# Patient Record
Sex: FEMALE | Race: WHITE | NOT HISPANIC OR LATINO | Employment: FULL TIME | URBAN - METROPOLITAN AREA
[De-identification: names, ages, dates, MRNs, and addresses within clinical notes are randomized per-mention and may not be internally consistent; named-entity substitution may affect disease eponyms.]

---

## 2023-01-10 ENCOUNTER — HOSPITAL ENCOUNTER (EMERGENCY)
Facility: HOSPITAL | Age: 38
Discharge: HOME | End: 2023-01-10
Attending: EMERGENCY MEDICINE
Payer: COMMERCIAL

## 2023-01-10 VITALS
WEIGHT: 192.88 LBS | BODY MASS INDEX: 34.18 KG/M2 | OXYGEN SATURATION: 98 % | SYSTOLIC BLOOD PRESSURE: 114 MMHG | TEMPERATURE: 99 F | HEIGHT: 63 IN | RESPIRATION RATE: 17 BRPM | DIASTOLIC BLOOD PRESSURE: 68 MMHG | HEART RATE: 86 BPM

## 2023-01-10 DIAGNOSIS — R10.32 LEFT LOWER QUADRANT PAIN: Primary | ICD-10-CM

## 2023-01-10 DIAGNOSIS — N83.201 BILATERAL OVARIAN CYSTS: ICD-10-CM

## 2023-01-10 DIAGNOSIS — N83.202 BILATERAL OVARIAN CYSTS: ICD-10-CM

## 2023-01-10 LAB
ALBUMIN SERPL BCP-MCNC: 3.9 G/DL (ref 3.5–5.2)
ALP SERPL-CCNC: 64 U/L (ref 55–135)
ALT SERPL W/O P-5'-P-CCNC: 26 U/L (ref 10–44)
ANION GAP SERPL CALC-SCNC: 7 MMOL/L (ref 8–16)
AST SERPL-CCNC: 17 U/L (ref 10–40)
B-HCG UR QL: NEGATIVE
BASOPHILS # BLD AUTO: 0.06 K/UL (ref 0–0.2)
BASOPHILS NFR BLD: 0.5 % (ref 0–1.9)
BILIRUB SERPL-MCNC: 0.3 MG/DL (ref 0.1–1)
BUN SERPL-MCNC: 8 MG/DL (ref 6–20)
CALCIUM SERPL-MCNC: 8.7 MG/DL (ref 8.7–10.5)
CHLORIDE SERPL-SCNC: 107 MMOL/L (ref 95–110)
CO2 SERPL-SCNC: 22 MMOL/L (ref 23–29)
CREAT SERPL-MCNC: 0.7 MG/DL (ref 0.5–1.4)
DIFFERENTIAL METHOD: ABNORMAL
EOSINOPHIL # BLD AUTO: 0.2 K/UL (ref 0–0.5)
EOSINOPHIL NFR BLD: 1.9 % (ref 0–8)
ERYTHROCYTE [DISTWIDTH] IN BLOOD BY AUTOMATED COUNT: 13.5 % (ref 11.5–14.5)
EST. GFR  (NO RACE VARIABLE): >60 ML/MIN/1.73 M^2
GLUCOSE SERPL-MCNC: 120 MG/DL (ref 70–110)
HCT VFR BLD AUTO: 42.9 % (ref 37–48.5)
HGB BLD-MCNC: 14.6 G/DL (ref 12–16)
IMM GRANULOCYTES # BLD AUTO: 0.04 K/UL (ref 0–0.04)
IMM GRANULOCYTES NFR BLD AUTO: 0.4 % (ref 0–0.5)
LYMPHOCYTES # BLD AUTO: 3.1 K/UL (ref 1–4.8)
LYMPHOCYTES NFR BLD: 27.9 % (ref 18–48)
MCH RBC QN AUTO: 31.6 PG (ref 27–31)
MCHC RBC AUTO-ENTMCNC: 34 G/DL (ref 32–36)
MCV RBC AUTO: 93 FL (ref 82–98)
MONOCYTES # BLD AUTO: 0.9 K/UL (ref 0.3–1)
MONOCYTES NFR BLD: 8.3 % (ref 4–15)
NEUTROPHILS # BLD AUTO: 6.9 K/UL (ref 1.8–7.7)
NEUTROPHILS NFR BLD: 61 % (ref 38–73)
NRBC BLD-RTO: 0 /100 WBC
PLATELET # BLD AUTO: 311 K/UL (ref 150–450)
PMV BLD AUTO: 10.1 FL (ref 9.2–12.9)
POTASSIUM SERPL-SCNC: 3.5 MMOL/L (ref 3.5–5.1)
PROT SERPL-MCNC: 7.1 G/DL (ref 6–8.4)
RBC # BLD AUTO: 4.62 M/UL (ref 4–5.4)
SODIUM SERPL-SCNC: 136 MMOL/L (ref 136–145)
WBC # BLD AUTO: 11.26 K/UL (ref 3.9–12.7)

## 2023-01-10 PROCEDURE — 86803 HEPATITIS C AB TEST: CPT | Performed by: EMERGENCY MEDICINE

## 2023-01-10 PROCEDURE — 85025 COMPLETE CBC W/AUTO DIFF WBC: CPT | Performed by: EMERGENCY MEDICINE

## 2023-01-10 PROCEDURE — 63600175 PHARM REV CODE 636 W HCPCS: Performed by: EMERGENCY MEDICINE

## 2023-01-10 PROCEDURE — 25500020 PHARM REV CODE 255

## 2023-01-10 PROCEDURE — 81025 URINE PREGNANCY TEST: CPT | Performed by: EMERGENCY MEDICINE

## 2023-01-10 PROCEDURE — 99285 EMERGENCY DEPT VISIT HI MDM: CPT | Mod: 25

## 2023-01-10 PROCEDURE — 80053 COMPREHEN METABOLIC PANEL: CPT | Performed by: EMERGENCY MEDICINE

## 2023-01-10 PROCEDURE — 96374 THER/PROPH/DIAG INJ IV PUSH: CPT

## 2023-01-10 PROCEDURE — 87389 HIV-1 AG W/HIV-1&-2 AB AG IA: CPT | Performed by: EMERGENCY MEDICINE

## 2023-01-10 PROCEDURE — 36415 COLL VENOUS BLD VENIPUNCTURE: CPT | Performed by: EMERGENCY MEDICINE

## 2023-01-10 RX ORDER — KETOROLAC TROMETHAMINE 30 MG/ML
30 INJECTION, SOLUTION INTRAMUSCULAR; INTRAVENOUS
Status: COMPLETED | OUTPATIENT
Start: 2023-01-10 | End: 2023-01-10

## 2023-01-10 RX ORDER — HYDROCODONE BITARTRATE AND ACETAMINOPHEN 5; 325 MG/1; MG/1
1 TABLET ORAL EVERY 6 HOURS PRN
Qty: 12 TABLET | Refills: 0 | Status: SHIPPED | OUTPATIENT
Start: 2023-01-10 | End: 2023-01-20

## 2023-01-10 RX ADMIN — KETOROLAC TROMETHAMINE 30 MG: 30 INJECTION, SOLUTION INTRAMUSCULAR; INTRAVENOUS at 06:01

## 2023-01-10 RX ADMIN — IOHEXOL 100 ML: 350 INJECTION, SOLUTION INTRAVENOUS at 06:01

## 2023-01-10 NOTE — Clinical Note
"Clemente Dixonie" Jon was seen and treated in our emergency department on 1/10/2023.  She may return to work on 01/12/2023.       If you have any questions or concerns, please don't hesitate to call.      Tracy Beard RN    "

## 2023-01-10 NOTE — ED PROVIDER NOTES
Encounter Date: 1/10/2023    SCRIBE #1 NOTE: I, Aman Rosario, am scribing for, and in the presence of,  Roberto Dubose III, MD.     History     Chief Complaint   Patient presents with    Abdominal Pain     LL abd pain patient reports that she has an ovarian cyst. Gyn appt on Monday     Time seen by provider: 4:59 PM on 01/10/2023    Clemente Webb is a 37 y.o. female who presents to the ED with an onset of acute on chronic left lower abdominal pain that began 3 weeks ago. She states she has had the discomfort in the same area for several years, starting with her diagnosis of an ovarian cyst about 4-5 years ago. She describes the pain as a discomfort for several years, painful for several weeks, and very painful for about a day. She has an appointment to meet with her gynecologist in 5 days. She and her  state that is very unlikely she is pregnant, because they haven't been having sex recently due to her symptoms. She denies being on any birth control. She does not report any allergies to medications. The patient denies vaginal discharge, irregular vaginal bleeding, or any other symptoms at this time. PMHx of ovarian cyst. There is no pertinent PSHx.    The history is provided by the patient and the spouse.   Review of patient's allergies indicates:  No Known Allergies  Past Medical History:   Diagnosis Date    Ovarian cyst      Past Surgical History:   Procedure Laterality Date    MOUTH SURGERY       History reviewed. No pertinent family history.  Social History     Tobacco Use    Smoking status: Every Day     Packs/day: 2.00     Types: Cigarettes    Smokeless tobacco: Never   Substance Use Topics    Alcohol use: Not Currently    Drug use: Never     Review of Systems   Constitutional:  Negative for activity change, appetite change, chills, fatigue and fever.   Eyes:  Negative for visual disturbance.   Respiratory:  Negative for apnea and shortness of breath.    Cardiovascular:  Negative for chest pain and  palpitations.   Gastrointestinal:  Positive for abdominal pain. Negative for abdominal distention.   Genitourinary:  Negative for difficulty urinating, vaginal bleeding and vaginal discharge.   Musculoskeletal:  Negative for neck pain.   Skin:  Negative for pallor and rash.   Neurological:  Negative for headaches.   Hematological:  Does not bruise/bleed easily.   Psychiatric/Behavioral:  Negative for agitation.      Physical Exam     Initial Vitals   BP Pulse Resp Temp SpO2   01/10/23 1646 01/10/23 1646 01/10/23 1646 01/10/23 1651 01/10/23 1646   120/79 94 18 98.3 °F (36.8 °C) 98 %      MAP       --                Physical Exam    Nursing note and vitals reviewed.  Constitutional: She appears well-developed and well-nourished.   HENT:   Head: Normocephalic and atraumatic.   Eyes: Conjunctivae are normal.   Neck: Neck supple.   Normal range of motion.  Cardiovascular:  Normal rate, regular rhythm and normal heart sounds.     Exam reveals no gallop and no friction rub.       No murmur heard.  Pulmonary/Chest: Effort normal and breath sounds normal. No respiratory distress. She has no wheezes. She has no rhonchi. She has no rales.   Abdominal: Abdomen is soft. She exhibits no distension. There is abdominal tenderness in the left lower quadrant. There is no rebound and no guarding.   Musculoskeletal:         General: Normal range of motion.      Cervical back: Normal range of motion and neck supple.     Neurological: She is alert and oriented to person, place, and time.   Skin: Skin is warm and dry. No erythema.   Psychiatric: She has a normal mood and affect.       ED Course   Procedures  Labs Reviewed   CBC W/ AUTO DIFFERENTIAL - Abnormal; Notable for the following components:       Result Value    MCH 31.6 (*)     All other components within normal limits    Narrative:     Release to patient->Immediate   COMPREHENSIVE METABOLIC PANEL - Abnormal; Notable for the following components:    CO2 22 (*)     Glucose 120 (*)      Anion Gap 7 (*)     All other components within normal limits    Narrative:     Release to patient->Immediate   HIV 1 / 2 ANTIBODY    Narrative:     Release to patient->Immediate   HEPATITIS C ANTIBODY    Narrative:     Release to patient->Immediate   PREGNANCY TEST, URINE RAPID    Narrative:     Specimen Source->Urine          Imaging Results              CT Abdomen Pelvis With Contrast (Final result)  Result time 01/10/23 18:44:36      Final result by Alma Nickerson MD (01/10/23 18:44:36)                   Impression:      No acute abnormality within the abdomen or pelvis to explain patient's pain.    Nonobstructing 2 mm right renal stone.    Bilateral ovarian cysts, largest measuring 12 mm.      Electronically signed by: Alma Nickerson  Date:    01/10/2023  Time:    18:44               Narrative:    EXAMINATION:  CT ABDOMEN PELVIS WITH CONTRAST    CLINICAL HISTORY:  LLQ abdominal pain;    TECHNIQUE:  Low dose axial images, sagittal and coronal reformations were obtained from the lung bases to the pubic symphysis following the IV administration of 100 mL of Omnipaque 350 .  Oral contrast was not given.    COMPARISON:  None.    FINDINGS:  Heart size is normal.  No pericardial effusion.  Patchy basilar airspace opacities versus mosaic attenuation.    Normal in morphology and with smooth contour.  No focal hepatic lesion.  Portal, splenic and superior mesenteric veins are patent.    The gallbladder, spleen, pancreas, adrenal glands are normal.  No intra or extrahepatic biliary ductal dilatation.    The kidneys enhance symmetrically without hydronephrosis.  Nonobstructing 2 mm right renal stone.    Abdominal aorta is normal in caliber.  No large retroperitoneal lymph nodes.    No bowel obstruction or inflammatory change.  Normal appendix.  No free fluid free air.    Uterus is unremarkable.  Bilateral ovarian cysts, largest measuring 13 mm.  No enlarged pelvic lymph nodes.  Bladder is smooth walled.    No soft tissues  are unremarkable.  No acute or aggressive osseous abnormality.                                       Medications   ketorolac injection 30 mg (30 mg Intravenous Given 1/10/23 1826)   iohexoL (OMNIPAQUE 350) 350 mg iodine/mL injection (100 mLs Intravenous Given 1/10/23 1822)     Medical Decision Making:   History:   Old Medical Records: I decided to obtain old medical records.  Clinical Tests:   Lab Tests: Ordered and Reviewed  Radiological Study: Ordered and Reviewed  ED Management:  37-year-old female with a history of chronic pelvic pain presents with acute worsening with left lower quadrant pain.  CT of the abdomen demonstrates bilateral ovarian cyst with no evidence of rupture or torsion.  The ongoing pain is of uncertain etiology.  She is referred to gyn for further workup.  There is no evidence of diverticulitis, appendicitis, ureterolithiasis or intra-abdominal infection.     APC / Resident Notes:   I, Dr. Roberto Dubose III, personally performed the services described in this documentation. All medical record entries made by the scribe were at my direction and in my presence.  I have reviewed the chart and agree that the record reflects my personal performance and is accurate and complete   Scribe Attestation:   Scribe #1: I performed the above scribed service and the documentation accurately describes the services I performed. I attest to the accuracy of the note.                   Clinical Impression:   Final diagnoses:  [R10.32] Left lower quadrant pain (Primary)  [N83.201, N83.202] Bilateral ovarian cysts        ED Disposition Condition    Discharge Stable          ED Prescriptions       Medication Sig Dispense Start Date End Date Auth. Provider    HYDROcodone-acetaminophen (NORCO) 5-325 mg per tablet Take 1 tablet by mouth every 6 (six) hours as needed for Pain. 12 tablet 1/10/2023 1/20/2023 Roberto Dubose III, MD          Follow-up Information       Follow up With Specialties Details Why Contact Info     Your OB  In 5 days               Roberto Dubose III, MD  01/12/23 0290

## 2023-01-10 NOTE — ED NOTES
Pt reports having cyst on Left Ovary since 2018, Pt will occasionally have blood tinged discharge. Pt describes LLQ pain as aching and pinching.

## 2023-01-11 LAB
HCV AB SERPL QL IA: NORMAL
HIV 1+2 AB+HIV1 P24 AG SERPL QL IA: NORMAL

## 2023-05-11 ENCOUNTER — OFFICE VISIT (OUTPATIENT)
Dept: URGENT CARE | Facility: CLINIC | Age: 38
End: 2023-05-11
Payer: COMMERCIAL

## 2023-05-11 VITALS
SYSTOLIC BLOOD PRESSURE: 102 MMHG | OXYGEN SATURATION: 97 % | BODY MASS INDEX: 34.55 KG/M2 | RESPIRATION RATE: 18 BRPM | HEART RATE: 81 BPM | DIASTOLIC BLOOD PRESSURE: 82 MMHG | WEIGHT: 195 LBS | HEIGHT: 63 IN | TEMPERATURE: 97 F

## 2023-05-11 DIAGNOSIS — L73.9 FOLLICULITIS: Primary | ICD-10-CM

## 2023-05-11 DIAGNOSIS — K21.9 GASTROESOPHAGEAL REFLUX DISEASE, UNSPECIFIED WHETHER ESOPHAGITIS PRESENT: ICD-10-CM

## 2023-05-11 DIAGNOSIS — L02.436: ICD-10-CM

## 2023-05-11 PROCEDURE — 99204 PR OFFICE/OUTPT VISIT, NEW, LEVL IV, 45-59 MIN: ICD-10-PCS | Mod: S$GLB,,, | Performed by: NURSE PRACTITIONER

## 2023-05-11 PROCEDURE — 99204 OFFICE O/P NEW MOD 45 MIN: CPT | Mod: S$GLB,,, | Performed by: NURSE PRACTITIONER

## 2023-05-11 RX ORDER — CEPHALEXIN 500 MG/1
500 CAPSULE ORAL EVERY 8 HOURS
Qty: 30 CAPSULE | Refills: 0 | Status: SHIPPED | OUTPATIENT
Start: 2023-05-11 | End: 2023-05-21

## 2023-05-11 RX ORDER — MUPIROCIN 20 MG/G
OINTMENT TOPICAL 3 TIMES DAILY
COMMUNITY
Start: 2022-11-15 | End: 2023-05-29 | Stop reason: ALTCHOICE

## 2023-05-11 RX ORDER — ACETAMINOPHEN 500 MG
3 TABLET ORAL NIGHTLY PRN
COMMUNITY

## 2023-05-11 RX ORDER — OMEPRAZOLE 40 MG/1
40 CAPSULE, DELAYED RELEASE ORAL DAILY
Qty: 21 CAPSULE | Refills: 0 | Status: SHIPPED | OUTPATIENT
Start: 2023-05-11 | End: 2023-06-01

## 2023-05-11 NOTE — PROGRESS NOTES
"Subjective:      Patient ID: Clemente Webb is a 37 y.o. female.    Vitals:  height is 5' 3" (1.6 m) and weight is 88.5 kg (195 lb). Her temperature is 97 °F (36.1 °C). Her blood pressure is 102/82 and her pulse is 81. Her respiration is 18 and oxygen saturation is 97%.     Chief Complaint: Cyst    Cyst  This is a recurrent problem. Episode onset: x's 4 days. The problem has been gradually worsening. Associated symptoms include vomiting (woke from sleep . Hx GERD. No meds). Treatments tried: mupiorocon. The treatment provided no relief.     Constitution: Negative.   HENT: Negative.     Neck: neck negative.   Cardiovascular: Negative.    Eyes: Negative.    Respiratory: Negative.     Gastrointestinal:  Positive for vomiting (woke from sleep . Hx GERD. No meds).   Endocrine: negative.   Genitourinary: Negative.    Musculoskeletal: Negative.    Skin: Negative.     Objective:     Physical Exam   Constitutional: She is oriented to person, place, and time. She appears well-developed.   HENT:   Head: Normocephalic and atraumatic.   Ears:   Right Ear: External ear normal.   Left Ear: External ear normal.   Nose: Nose normal.   Mouth/Throat: Mucous membranes are normal.   Eyes: Conjunctivae and lids are normal.   Neck: Trachea normal. Neck supple.   Cardiovascular: Normal rate, regular rhythm and normal heart sounds.   Pulmonary/Chest: Effort normal and breath sounds normal. No respiratory distress.   Abdominal: Normal appearance and bowel sounds are normal. She exhibits no distension and no mass. Soft. There is abdominal tenderness in the epigastric area.   Musculoskeletal: Normal range of motion.         General: Normal range of motion.   Neurological: She is alert and oriented to person, place, and time. She has normal strength.   Skin: Skin is warm, dry, intact, not diaphoretic, not pale, rash, macular and maculopapular.        Psychiatric: Her speech is normal and behavior is normal. Judgment and thought content normal. "   Nursing note and vitals reviewed.    Assessment:     1. Folliculitis    2. Carbuncle of left thigh    3. Gastroesophageal reflux disease, unspecified whether esophagitis present        Plan:       Folliculitis  -     cephALEXin (KEFLEX) 500 MG capsule; Take 1 capsule (500 mg total) by mouth every 8 (eight) hours. for 10 days  Dispense: 30 capsule; Refill: 0    Carbuncle of left thigh  -     cephALEXin (KEFLEX) 500 MG capsule; Take 1 capsule (500 mg total) by mouth every 8 (eight) hours. for 10 days  Dispense: 30 capsule; Refill: 0    Gastroesophageal reflux disease, unspecified whether esophagitis present  -     omeprazole (PRILOSEC) 40 MG capsule; Take 1 capsule (40 mg total) by mouth once daily. for 21 days  Dispense: 21 capsule; Refill: 0

## 2023-05-29 ENCOUNTER — OFFICE VISIT (OUTPATIENT)
Dept: FAMILY MEDICINE | Facility: CLINIC | Age: 38
End: 2023-05-29
Payer: COMMERCIAL

## 2023-05-29 VITALS
SYSTOLIC BLOOD PRESSURE: 102 MMHG | OXYGEN SATURATION: 98 % | HEART RATE: 97 BPM | RESPIRATION RATE: 16 BRPM | WEIGHT: 198 LBS | TEMPERATURE: 98 F | HEIGHT: 63 IN | BODY MASS INDEX: 35.08 KG/M2 | DIASTOLIC BLOOD PRESSURE: 78 MMHG

## 2023-05-29 DIAGNOSIS — Z13.1 ENCOUNTER FOR SCREENING EXAMINATION FOR IMPAIRED GLUCOSE REGULATION AND DIABETES MELLITUS: ICD-10-CM

## 2023-05-29 DIAGNOSIS — Z12.31 ENCOUNTER FOR SCREENING MAMMOGRAM FOR MALIGNANT NEOPLASM OF BREAST: ICD-10-CM

## 2023-05-29 DIAGNOSIS — Z13.6 ENCOUNTER FOR LIPID SCREENING FOR CARDIOVASCULAR DISEASE: ICD-10-CM

## 2023-05-29 DIAGNOSIS — M25.561 ACUTE PAIN OF RIGHT KNEE: ICD-10-CM

## 2023-05-29 DIAGNOSIS — Z76.89 ESTABLISHING CARE WITH NEW DOCTOR, ENCOUNTER FOR: Primary | ICD-10-CM

## 2023-05-29 DIAGNOSIS — Z13.220 ENCOUNTER FOR LIPID SCREENING FOR CARDIOVASCULAR DISEASE: ICD-10-CM

## 2023-05-29 PROBLEM — G89.29 CHRONIC NONINTRACTABLE HEADACHE: Status: ACTIVE | Noted: 2021-08-23

## 2023-05-29 PROBLEM — G47.00 INSOMNIA: Status: ACTIVE | Noted: 2021-08-23

## 2023-05-29 PROBLEM — F90.0 ATTENTION DEFICIT HYPERACTIVITY DISORDER (ADHD), PREDOMINANTLY INATTENTIVE TYPE: Status: ACTIVE | Noted: 2021-08-23

## 2023-05-29 PROBLEM — I95.1 CHRONIC ORTHOSTATIC HYPOTENSION: Status: ACTIVE | Noted: 2021-08-23

## 2023-05-29 PROBLEM — R51.9 CHRONIC NONINTRACTABLE HEADACHE: Status: ACTIVE | Noted: 2021-08-23

## 2023-05-29 PROCEDURE — 99214 PR OFFICE/OUTPT VISIT, EST, LEVL IV, 30-39 MIN: ICD-10-PCS | Mod: ,,, | Performed by: NURSE PRACTITIONER

## 2023-05-29 PROCEDURE — 99214 OFFICE O/P EST MOD 30 MIN: CPT | Mod: ,,, | Performed by: NURSE PRACTITIONER

## 2023-05-29 NOTE — PROGRESS NOTES
Subjective:       Patient ID: Clemente Webb is a 38 y.o. female.    Chief Complaint: Establish Care and Knee Pain    Clemente Webb presents to the clinic today to establish care, but reports new onset of right knee pain following an accident with her 55 pound bulldog on Friday  Reports history of ovarian cysts- under [facility] in [place].   Reports newly rescued Bulldog ran at her on Friday making impact with her right knee knocking her backwards onto the ground. Reports with impact she felt/heard a pop like sensation and began having right knee pain. Since Friday she has been elevating, resting, alternating Tylenol and 800 mg Ibuprofen. Reports she did take a left over Norco 5 mg she had at home on Friday night after the initial accident. She reports since the injury she has been experiencing pain, swelling, decreased ROM. States that some of the swelling has gotten slightly better, but has not resolved.     Review of Systems   All other systems reviewed and are negative.    Patient Active Problem List   Diagnosis    Attention deficit hyperactivity disorder (ADHD), predominantly inattentive type    Chronic nonintractable headache    Chronic orthostatic hypotension    Insomnia    Low back pain       Objective:      Physical Exam  Constitutional:       General: She is not in acute distress.     Appearance: Normal appearance.   Eyes:      Extraocular Movements: Extraocular movements intact.      Conjunctiva/sclera: Conjunctivae normal.   Cardiovascular:      Rate and Rhythm: Normal rate and regular rhythm.      Heart sounds: Normal heart sounds. No murmur heard.  Pulmonary:      Effort: Pulmonary effort is normal. No respiratory distress.      Breath sounds: Normal breath sounds.   Musculoskeletal:      Right knee: Swelling and bony tenderness present. Decreased range of motion. Tenderness present.      Right lower leg: No edema.      Left lower leg: No edema.   Skin:     General: Skin is warm and dry.      Findings: No  "rash.   Neurological:      Mental Status: She is alert and oriented to person, place, and time.   Psychiatric:         Mood and Affect: Mood normal.         Behavior: Behavior normal.       Lab Results   Component Value Date    WBC 11.26 01/10/2023    HGB 14.6 01/10/2023    HCT 42.9 01/10/2023     01/10/2023    ALT 26 01/10/2023    AST 17 01/10/2023     01/10/2023    K 3.5 01/10/2023     01/10/2023    CREATININE 0.7 01/10/2023    BUN 8 01/10/2023    CO2 22 (L) 01/10/2023     The ASCVD Risk score (Arabella WELCH, et al., 2019) failed to calculate for the following reasons:    Cannot find a previous HDL lab    Cannot find a previous total cholesterol lab  Visit Vitals  /78 (BP Location: Left arm, Patient Position: Sitting, BP Method: Medium (Manual))   Pulse 97   Temp 98.1 °F (36.7 °C) (Oral)   Resp 16   Ht 5' 3" (1.6 m)   Wt 89.8 kg (197 lb 15.6 oz)   LMP 05/11/2023   SpO2 98%   BMI 35.07 kg/m²      Assessment:       1. Establishing care with new doctor, encounter for    2. Acute pain of right knee    3. Encounter for lipid screening for cardiovascular disease    4. Encounter for screening examination for impaired glucose regulation and diabetes mellitus    5. Encounter for screening mammogram for malignant neoplasm of breast        Plan:       1. Establishing care with new doctor, encounter for    2. Acute pain of right knee  -     X-Ray Knee 3 View Right; Future; Expected date: 05/29/2023  Continue RICE   Continue alternating Tylenol/Ibuprofen for discomfort   Obtain image for review.   3. Encounter for lipid screening for cardiovascular disease  -     Lipid Panel; Future; Expected date: 05/29/2023    4. Encounter for screening examination for impaired glucose regulation and diabetes mellitus  -     Hemoglobin A1C; Standing    5. Encounter for screening mammogram for malignant neoplasm of breast  -     Mammo Digital Screening Bilat w/ Tashi; Future; Expected date: 05/29/2023  Monthly self breast " exams   Obtain screening mammogram      No follow-ups on file.      Future Appointments       Date Provider Specialty Appt Notes    6/1/2023  Radiology Acute pain of right knee [M25.561]    6/1/2023  Lab     6/12/2023 Michelle Klein NP Family Medicine 2 week f/u

## 2023-06-01 ENCOUNTER — TELEPHONE (OUTPATIENT)
Dept: ORTHOPEDICS | Facility: CLINIC | Age: 38
End: 2023-06-01
Payer: COMMERCIAL

## 2023-06-01 ENCOUNTER — HOSPITAL ENCOUNTER (OUTPATIENT)
Dept: RADIOLOGY | Facility: CLINIC | Age: 38
Discharge: HOME | End: 2023-06-01
Attending: NURSE PRACTITIONER
Payer: COMMERCIAL

## 2023-06-01 ENCOUNTER — LAB VISIT (OUTPATIENT)
Dept: LAB | Facility: CLINIC | Age: 38
End: 2023-06-01
Payer: COMMERCIAL

## 2023-06-01 DIAGNOSIS — Z13.220 ENCOUNTER FOR LIPID SCREENING FOR CARDIOVASCULAR DISEASE: ICD-10-CM

## 2023-06-01 DIAGNOSIS — Z13.1 ENCOUNTER FOR SCREENING EXAMINATION FOR IMPAIRED GLUCOSE REGULATION AND DIABETES MELLITUS: ICD-10-CM

## 2023-06-01 DIAGNOSIS — S82.141A CLOSED FRACTURE OF RIGHT TIBIAL PLATEAU, INITIAL ENCOUNTER: Primary | ICD-10-CM

## 2023-06-01 DIAGNOSIS — Z13.6 ENCOUNTER FOR LIPID SCREENING FOR CARDIOVASCULAR DISEASE: ICD-10-CM

## 2023-06-01 DIAGNOSIS — M25.561 ACUTE PAIN OF RIGHT KNEE: ICD-10-CM

## 2023-06-01 LAB
CHOLEST SERPL-MCNC: 183 MG/DL (ref 120–199)
CHOLEST/HDLC SERPL: 5.1 {RATIO} (ref 2–5)
ESTIMATED AVG GLUCOSE: 97 MG/DL (ref 68–131)
HBA1C MFR BLD: 5 % (ref 4–5.6)
HDLC SERPL-MCNC: 36 MG/DL (ref 40–75)
HDLC SERPL: 19.7 % (ref 20–50)
LDLC SERPL CALC-MCNC: 125.6 MG/DL (ref 63–159)
NONHDLC SERPL-MCNC: 147 MG/DL
TRIGL SERPL-MCNC: 107 MG/DL (ref 30–150)

## 2023-06-01 PROCEDURE — 73562 X-RAY EXAM OF KNEE 3: CPT | Mod: 26,RT,, | Performed by: RADIOLOGY

## 2023-06-01 PROCEDURE — 73562 X-RAY EXAM OF KNEE 3: CPT | Mod: TC,RT,, | Performed by: FAMILY MEDICINE

## 2023-06-01 PROCEDURE — 73562 XR KNEE 3 VIEW RIGHT: ICD-10-PCS | Mod: 26,RT,, | Performed by: RADIOLOGY

## 2023-06-01 PROCEDURE — 73562 XR KNEE 3 VIEW RIGHT: ICD-10-PCS | Mod: TC,RT,, | Performed by: FAMILY MEDICINE

## 2023-06-01 PROCEDURE — 80061 LIPID PANEL: CPT | Performed by: NURSE PRACTITIONER

## 2023-06-01 PROCEDURE — 83036 HEMOGLOBIN GLYCOSYLATED A1C: CPT | Performed by: NURSE PRACTITIONER

## 2023-06-01 NOTE — TELEPHONE ENCOUNTER
----- Message from Yg Medellin MD sent at 6/1/2023 10:41 AM CDT -----  Contact: patient  I am okay with seeing her but we are out on Monday so unless she wants to come to [place] on Wednesday, June 8th this our next available.  They could certainly see Kirill or haylee or Luke if they have sooner appts  ----- Message -----  From: Madhuri Pineda MA  Sent: 6/1/2023  10:27 AM CDT  To: Yg Medellin MD    Saw family [facility] today   Ok with seeing?   ----- Message -----  From: Milton Redd MA  Sent: 6/1/2023  10:03 AM CDT  To: Vignesh Barreto Staff    Patient has referral in system from her PCP for S82.141A (ICD-10-CM) - Closed fracture of right tibial plateau, initial encounter but stated she would like to be seen prior to next available of 6/8/23.  Referral is for Dr. Medellin.    Call back number is [phone]

## 2023-06-05 ENCOUNTER — PATIENT MESSAGE (OUTPATIENT)
Dept: FAMILY MEDICINE | Facility: CLINIC | Age: 38
End: 2023-06-05
Payer: COMMERCIAL

## 2023-06-08 ENCOUNTER — OFFICE VISIT (OUTPATIENT)
Dept: ORTHOPEDICS | Facility: CLINIC | Age: 38
End: 2023-06-08
Payer: COMMERCIAL

## 2023-06-08 ENCOUNTER — HOSPITAL ENCOUNTER (OUTPATIENT)
Dept: RADIOLOGY | Facility: HOSPITAL | Age: 38
Discharge: HOME | End: 2023-06-08
Attending: ORTHOPAEDIC SURGERY
Payer: COMMERCIAL

## 2023-06-08 VITALS — BODY MASS INDEX: 34.91 KG/M2 | WEIGHT: 197 LBS | HEIGHT: 63 IN | RESPIRATION RATE: 18 BRPM

## 2023-06-08 DIAGNOSIS — M25.561 RIGHT KNEE PAIN, UNSPECIFIED CHRONICITY: ICD-10-CM

## 2023-06-08 DIAGNOSIS — M25.561 RIGHT KNEE PAIN, UNSPECIFIED CHRONICITY: Primary | ICD-10-CM

## 2023-06-08 DIAGNOSIS — S82.141A CLOSED FRACTURE OF RIGHT TIBIAL PLATEAU, INITIAL ENCOUNTER: ICD-10-CM

## 2023-06-08 PROCEDURE — 73564 X-RAY EXAM KNEE 4 OR MORE: CPT | Mod: 26,RT,, | Performed by: RADIOLOGY

## 2023-06-08 PROCEDURE — 99999 PR PBB SHADOW E&M-EST. PATIENT-LVL III: CPT | Mod: PBBFAC,,, | Performed by: ORTHOPAEDIC SURGERY

## 2023-06-08 PROCEDURE — 73562 XR KNEE ORTHO RIGHT WITH FLEXION: ICD-10-PCS | Mod: 26,LT,, | Performed by: RADIOLOGY

## 2023-06-08 PROCEDURE — 99204 OFFICE O/P NEW MOD 45 MIN: CPT | Mod: S$GLB,,, | Performed by: ORTHOPAEDIC SURGERY

## 2023-06-08 PROCEDURE — 73562 X-RAY EXAM OF KNEE 3: CPT | Mod: 26,LT,, | Performed by: RADIOLOGY

## 2023-06-08 PROCEDURE — 73564 XR KNEE ORTHO RIGHT WITH FLEXION: ICD-10-PCS | Mod: 26,RT,, | Performed by: RADIOLOGY

## 2023-06-08 PROCEDURE — 99204 PR OFFICE/OUTPT VISIT, NEW, LEVL IV, 45-59 MIN: ICD-10-PCS | Mod: S$GLB,,, | Performed by: ORTHOPAEDIC SURGERY

## 2023-06-08 PROCEDURE — 73564 X-RAY EXAM KNEE 4 OR MORE: CPT | Mod: TC,PO,RT

## 2023-06-08 PROCEDURE — 99999 PR PBB SHADOW E&M-EST. PATIENT-LVL III: ICD-10-PCS | Mod: PBBFAC,,, | Performed by: ORTHOPAEDIC SURGERY

## 2023-06-08 NOTE — PROGRESS NOTES
Past Medical History:   Diagnosis Date    Ovarian cyst        Past Surgical History:   Procedure Laterality Date    CERVICAL BIOPSY  W/ LOOP ELECTRODE EXCISION      MOUTH SURGERY         Current Outpatient Medications   Medication Sig    melatonin (MELATIN) 5 mg Take 3 mg by mouth nightly as needed.    omeprazole (PRILOSEC) 40 MG capsule Take 1 capsule (40 mg total) by mouth once daily. for 21 days     No current facility-administered medications for this visit.       Review of patient's allergies indicates:   Allergen Reactions    Ketorolac tromethamine Rash       History reviewed. No pertinent family history.    Social History     Socioeconomic History    Marital status:    Tobacco Use    Smoking status: Every Day     Packs/day: 2.00     Types: Cigarettes    Smokeless tobacco: Never   Substance and Sexual Activity    Alcohol use: Not Currently    Drug use: Never    Sexual activity: Yes     Partners: Male       Chief Complaint: No chief complaint on file.      History of present illness:  38-year-old female seen for right knee pain.  Patient was injured on May 25th when her dog ran into her leg.  She felt a pop.  X-rays taken at a urgent care showed a tibial plateau fracture.  Patient is in a brace and using crutches.  Pain is getting better.  Pain is a 2/10.    Answers submitted by the patient for this visit:  Orthopedics Questionnaire (Submitted on 6/8/2023)  unexpected weight change: No  appetite change : No  sleep disturbance: No  IMMUNOCOMPROMISED: No  nervous/ anxious: No  dysphoric mood: No  rash: No  visual disturbance: No  eye redness: No  eye pain: No  ear pain: No  tinnitus: No  hearing loss: No  sinus pressure : No  nosebleeds: No  enviro allergies: No  food allergies: No  cough: No  shortness of breath: No  sweating: No  dysuria: No  frequency: No  difficulty urinating: No  hematuria: No  painful intercourse: No  chest pain: No  palpitations: No  nausea: No  vomiting: No  diarrhea: No  blood in  stool: No  constipation: No  headaches: No  dizziness: No  numbness: No  seizures: No  joint swelling: Yes  myalgia: Yes  weakness: Yes  back pain: No   (Submitted on 6/8/2023)  Chief Complaint: Leg pain  Pain Chronicity: new  History of trauma: Yes  Onset: 1 to 4 weeks ago  Frequency: daily  Progression since onset: waxing and waning  Injury mechanism: collision/contact  injury location: on the field  pain- numeric: 2/10  pain location: right knee  pain quality: dull, hot, tightness, burning, numbing, throbbing, tingling, numb  Radiating Pain: No  Aggravating factors: activity, bending, extension, rotation  fever: No  inability to bear weight: Yes  itching: No  joint locking: Yes  limited range of motion: Yes  stiffness: Yes  tingling: Yes  Treatments tried: rest  physical therapy: not tried  Improvement on treatment: no relief      Physical Examination:    Vital Signs:  There were no vitals filed for this visit.    There is no height or weight on file to calculate BMI.    This a well-developed, well nourished patient in no acute distress.  They are alert and oriented and cooperative to examination.  Pt. walks without an antalgic gait.      Examination of the right knee shows no rashes or erythema. There are no masses ecchymosis or effusion. Patient has full range of motion from 0-130°. Patient is mildly tender to palpation over lateral joint line and nontender to palpation over the medial joint line. Patient has a - Lachman exam, - anterior drawer exam, and - posterior drawer exam. - Apley exam. Knee is stable to varus and valgus stress. 5 out of 5 motor strength. Palpable distal pulses. Intact light touch sensation. Negative Patellofemoral crepitus      X-rays:  X-rays of the right knee are ordered and reviewed which show a mild depression on the lateral plateau.     Assessment::  Right tibial plateau fracture    Plan:  I reviewed the findings with her today.  Patient has a very minimally displaced fracture of  the lateral tibial plateau.  Recommended non operative treatment with a protective brace.  Recommend protected weight-bearing with nonweightbearing or toe-touch weight-bearing.  Okay for normal range of motion.  Follow-up in 4 weeks with repeat x-ray of the right knee.  If signs of healing can progress weight-bearing at that time    All previous pertinent notes including ER visits, physical therapy visits, other orthopedic visits as well as other care for the same musculoskeletal problem were reviewed.  All pertinent lab values and previous imaging was reviewed pertinent to the current visit.    This note was created using [employer] voice recognition software that occasionally misinterpreted phrases or words.    Consult note is delivered via Epic messaging service.

## 2023-06-12 ENCOUNTER — OFFICE VISIT (OUTPATIENT)
Dept: FAMILY MEDICINE | Facility: CLINIC | Age: 38
End: 2023-06-12
Payer: COMMERCIAL

## 2023-06-12 VITALS
SYSTOLIC BLOOD PRESSURE: 106 MMHG | HEART RATE: 90 BPM | DIASTOLIC BLOOD PRESSURE: 72 MMHG | BODY MASS INDEX: 35.44 KG/M2 | RESPIRATION RATE: 18 BRPM | HEIGHT: 63 IN | WEIGHT: 200 LBS | OXYGEN SATURATION: 97 %

## 2023-06-12 DIAGNOSIS — R51.9 CHRONIC NONINTRACTABLE HEADACHE, UNSPECIFIED HEADACHE TYPE: ICD-10-CM

## 2023-06-12 DIAGNOSIS — F17.200 NICOTINE DEPENDENCE WITH CURRENT USE: ICD-10-CM

## 2023-06-12 DIAGNOSIS — G89.29 CHRONIC NONINTRACTABLE HEADACHE, UNSPECIFIED HEADACHE TYPE: ICD-10-CM

## 2023-06-12 DIAGNOSIS — S82.141A CLOSED FRACTURE OF RIGHT TIBIAL PLATEAU, INITIAL ENCOUNTER: Primary | ICD-10-CM

## 2023-06-12 PROCEDURE — 99214 PR OFFICE/OUTPT VISIT, EST, LEVL IV, 30-39 MIN: ICD-10-PCS | Mod: ,,, | Performed by: NURSE PRACTITIONER

## 2023-06-12 PROCEDURE — 99214 OFFICE O/P EST MOD 30 MIN: CPT | Mod: ,,, | Performed by: NURSE PRACTITIONER

## 2023-06-12 RX ORDER — TOPIRAMATE 25 MG/1
TABLET ORAL
Qty: 60 TABLET | Refills: 3 | Status: SHIPPED | OUTPATIENT
Start: 2023-06-12

## 2023-06-12 RX ORDER — SUMATRIPTAN 50 MG/1
TABLET, FILM COATED ORAL
Qty: 9 TABLET | Refills: 5 | Status: SHIPPED | OUTPATIENT
Start: 2023-06-12

## 2023-06-12 NOTE — PROGRESS NOTES
Subjective:       Patient ID: Clemente Webb is a 38 y.o. female.    Chief Complaint: Follow-up (Pt presents to the clinic for a 2 week f/u)    Clemente Webb presents to the clinic today for 2 week follow up after recently establishing primary care     She was referred and seen by Orthopedics Dr. Medellin 6/8/2023    X-rays:  X-rays of the right knee are ordered and reviewed which show a mild depression on the lateral plateau.     Assessment::  Right tibial plateau fracture     Plan:  I reviewed the findings with her today.  Patient has a very minimally displaced fracture of the lateral tibial plateau.  Recommended non operative treatment with a protective brace.  Recommend protected weight-bearing with nonweightbearing or toe-touch weight-bearing.  Okay for normal range of motion.  Follow-up in 4 weeks with repeat x-ray of the right knee.  If signs of healing can progress weight-bearing at that time    Migraines:  HX of chronic intermittent headaches- described as different types.   Has never seen neurology. Denies any imaging historically. Was prescribed preventatives in the past, but was unable to afford them.   1st- most common, most bothersome. It usually starts in the posterior aspect of her head and it is dull and achy and it is often associated with photosensitivity and nausea. Worsens with coughing, sneezing. Describes as a pressure. Can lingers for hours.Sometimes they respond partially to over-the-counter products like Tylenol, ibuprofen and BC powders.     2nd Type- Occurs behind the eyes. Intense pressure. Induces nausea and light sensitivity. At times can have quick floaters that resolve. On occasion the pressure will go to the temporal region as sharp/fast stabbing/shooting sensations that quickly resolve.    Wears corrective lenses- is UTD on vision screen     Review of Systems   Constitutional: Negative.    HENT: Negative.     Eyes: Negative.    Respiratory:  Negative for chest tightness, shortness of  breath and wheezing.    Cardiovascular:  Negative for chest pain and palpitations.   Gastrointestinal:  Negative for abdominal pain, constipation and diarrhea.   Endocrine: Negative.    Genitourinary: Negative.    Musculoskeletal:  Positive for arthralgias and gait problem.   Allergic/Immunologic: Negative.    Neurological:  Positive for headaches. Negative for dizziness and weakness.   Hematological: Negative.    Psychiatric/Behavioral: Negative.       Patient Active Problem List   Diagnosis    Attention deficit hyperactivity disorder (ADHD), predominantly inattentive type    Chronic nonintractable headache    Chronic orthostatic hypotension    Insomnia    Low back pain       Objective:      Physical Exam  Constitutional:       General: She is not in acute distress.     Appearance: Normal appearance.      Comments: Ambulating with crutches    Eyes:      Extraocular Movements: Extraocular movements intact.      Conjunctiva/sclera: Conjunctivae normal.   Cardiovascular:      Rate and Rhythm: Normal rate and regular rhythm.      Heart sounds: Normal heart sounds. No murmur heard.  Pulmonary:      Effort: Pulmonary effort is normal. No respiratory distress.      Breath sounds: Normal breath sounds.   Musculoskeletal:      Right knee: Bony tenderness present. Decreased range of motion. Tenderness present.      Right lower leg: No edema.      Left lower leg: No edema.      Comments: Right knee brace intact    Skin:     General: Skin is warm and dry.      Findings: No rash.   Neurological:      Mental Status: She is alert and oriented to person, place, and time.   Psychiatric:         Mood and Affect: Mood normal.         Behavior: Behavior normal.       Lab Results   Component Value Date    WBC 11.26 01/10/2023    HGB 14.6 01/10/2023    HCT 42.9 01/10/2023     01/10/2023    CHOL 183 06/01/2023    TRIG 107 06/01/2023    HDL 36 (L) 06/01/2023    ALT 26 01/10/2023    AST 17 01/10/2023     01/10/2023    K 3.5  "01/10/2023     01/10/2023    CREATININE 0.7 01/10/2023    BUN 8 01/10/2023    CO2 22 (L) 01/10/2023    HGBA1C 5.0 06/01/2023     The 10-year ASCVD risk score (Arabella WELCH, et al., 2019) is: 3.2%    Values used to calculate the score:      Age: 38 years      Sex: Female      Is Non- : No      Diabetic: No      Tobacco smoker: Yes      Systolic Blood Pressure: 106 mmHg      Is BP treated: No      HDL Cholesterol: 36 mg/dL      Total Cholesterol: 183 mg/dL  Visit Vitals  /72 (BP Location: Left arm, Patient Position: Sitting, BP Method: Large (Manual))   Pulse 90   Resp 18   Ht 5' 3" (1.6 m)   Wt 90.7 kg (200 lb)   SpO2 97%   BMI 35.43 kg/m²      Assessment:       1. Closed fracture of right tibial plateau, initial encounter    2. Nicotine dependence with current use    3. Chronic nonintractable headache, unspecified headache type        Plan:       1. Closed fracture of right tibial plateau, initial encounter  Followed by orthopedics- keep all scheduled follow up appointments/ follow all activity restrictions   2. Nicotine dependence with current use  -     Ambulatory referral/consult to Smoking Cessation Program; Future; Expected date: 06/19/2023  Interested in smoking cessation- referral to program for further assistance.   3. Chronic nonintractable headache, unspecified headache type  -     sumatriptan (IMITREX) 50 MG tablet; Take 1 tablet at start of migraine, may repeat dose in 2 hours- max dose of 200 mg/day  Dispense: 9 tablet; Refill: 5  -     topiramate (TOPAMAX) 25 MG tablet; Take 1 tablet by mouth at bedtime x1 week. Then increase to 1 tablet by mouth twice daily  Dispense: 60 tablet; Refill: 3  Trial of topiramate- 25 mg nightly x7-10 days then increase to BID  IMITREX after use of Excedrin migraine  Avoid excessive intake of caffeine, nicotine   Keep migraine/headache journal   + Riboflavin 400 mg daily and Magnesium 400 mg daily   No significant improvement at follow up- " Neurology referral/recommend imaging   Follow up in about 6 weeks (around 7/24/2023).      Future Appointments       Date Provider Specialty Appt Notes    6/20/2023  Radiology     7/6/2023 Yg Medellin MD Orthopedics 4 week fu xray today right knee     7/25/2023 Michelle Klein NP Family Medicine 6 week f/u

## 2023-06-15 NOTE — PATIENT INSTRUCTIONS
Acute abortive treatment:     When migraine symptoms first develop, the patient should rest or sleep in a dark, quiet room with a cool cloth applied to forehead if possible. Early use of medication during the migraine attack, when the headache is still mild, is important      Step 1: For mild headaches or as first step in treatment, Take Excedrin Migraine as directed              -Limit to 14 days per month maximum to avoid medication overuse headache          Step 2: If step 1 medication does not get rid of headache, or if headache is severe from the start, also take sumatriptan (Imitrex) 50 mg               -This dose may be repeated a second time if headache still remains after 2 hours, with maximum of 2 doses per 24 hours               -Limit use to 9 days per month to avoid medication overuse headache               -You may combine this medication with ibuprofen for better effect if it is only somewhat effective               - Side effects may include chest pain/pressure/tightness, hot/cold flashes, sore throat, fatigue, feeling of heaviness, tingling, jaw pain/pressure, neck pain                 Daily preventive treatment     Given that this patient has frequent or long-lasting migraines, migraines that cause significant disability, will initiate prevention at this time with:     Riboflavin 400 mg daily- an cause yellow urine   Magnesium oxide (or elemental magnesium) 400 mg daily      They have previously tried 0 other preventive medications which were stopped for either side effects or lack of efficacy               -Should be continued for at least 6-8 weeks before determining effectiveness

## 2023-07-07 DIAGNOSIS — M25.561 RIGHT KNEE PAIN, UNSPECIFIED CHRONICITY: Primary | ICD-10-CM

## 2023-07-13 ENCOUNTER — OFFICE VISIT (OUTPATIENT)
Dept: ORTHOPEDICS | Facility: CLINIC | Age: 38
End: 2023-07-13
Payer: COMMERCIAL

## 2023-07-13 ENCOUNTER — HOSPITAL ENCOUNTER (OUTPATIENT)
Dept: RADIOLOGY | Facility: HOSPITAL | Age: 38
Discharge: HOME | End: 2023-07-13
Attending: ORTHOPAEDIC SURGERY
Payer: COMMERCIAL

## 2023-07-13 VITALS — WEIGHT: 200 LBS | RESPIRATION RATE: 18 BRPM | HEIGHT: 63 IN | BODY MASS INDEX: 35.44 KG/M2

## 2023-07-13 DIAGNOSIS — S82.141A CLOSED FRACTURE OF RIGHT TIBIAL PLATEAU, INITIAL ENCOUNTER: Primary | ICD-10-CM

## 2023-07-13 DIAGNOSIS — M25.561 RIGHT KNEE PAIN, UNSPECIFIED CHRONICITY: ICD-10-CM

## 2023-07-13 PROCEDURE — 99999 PR PBB SHADOW E&M-EST. PATIENT-LVL III: CPT | Mod: PBBFAC,,, | Performed by: ORTHOPAEDIC SURGERY

## 2023-07-13 PROCEDURE — 73564 XR KNEE ORTHO RIGHT WITH FLEXION: ICD-10-PCS | Mod: 26,RT,, | Performed by: RADIOLOGY

## 2023-07-13 PROCEDURE — 73564 X-RAY EXAM KNEE 4 OR MORE: CPT | Mod: 26,RT,, | Performed by: RADIOLOGY

## 2023-07-13 PROCEDURE — 99999 PR PBB SHADOW E&M-EST. PATIENT-LVL III: ICD-10-PCS | Mod: PBBFAC,,, | Performed by: ORTHOPAEDIC SURGERY

## 2023-07-13 PROCEDURE — 73564 X-RAY EXAM KNEE 4 OR MORE: CPT | Mod: TC,PO,RT

## 2023-07-13 PROCEDURE — 73562 XR KNEE ORTHO RIGHT WITH FLEXION: ICD-10-PCS | Mod: 26,LT,, | Performed by: RADIOLOGY

## 2023-07-13 PROCEDURE — 99214 PR OFFICE/OUTPT VISIT, EST, LEVL IV, 30-39 MIN: ICD-10-PCS | Mod: S$GLB,,, | Performed by: ORTHOPAEDIC SURGERY

## 2023-07-13 PROCEDURE — 73562 X-RAY EXAM OF KNEE 3: CPT | Mod: 26,LT,, | Performed by: RADIOLOGY

## 2023-07-13 PROCEDURE — 99214 OFFICE O/P EST MOD 30 MIN: CPT | Mod: S$GLB,,, | Performed by: ORTHOPAEDIC SURGERY

## 2023-07-13 NOTE — PROGRESS NOTES
Past Medical History:   Diagnosis Date    Ovarian cyst        Past Surgical History:   Procedure Laterality Date    CERVICAL BIOPSY  W/ LOOP ELECTRODE EXCISION      MOUTH SURGERY         Current Outpatient Medications   Medication Sig    melatonin (MELATIN) 5 mg Take 3 mg by mouth nightly as needed.    omeprazole (PRILOSEC) 40 MG capsule Take 1 capsule (40 mg total) by mouth once daily. for 21 days    sumatriptan (IMITREX) 50 MG tablet Take 1 tablet at start of migraine, may repeat dose in 2 hours- max dose of 200 mg/day    topiramate (TOPAMAX) 25 MG tablet Take 1 tablet by mouth at bedtime x1 week. Then increase to 1 tablet by mouth twice daily     No current facility-administered medications for this visit.       Review of patient's allergies indicates:   Allergen Reactions    Ketorolac tromethamine Rash       History reviewed. No pertinent family history.    Social History     Socioeconomic History    Marital status:    Tobacco Use    Smoking status: Every Day     Packs/day: 2.00     Types: Cigarettes    Smokeless tobacco: Never   Substance and Sexual Activity    Alcohol use: Not Currently    Drug use: Never    Sexual activity: Yes     Partners: Male       Chief Complaint: No chief complaint on file.      History of present illness:  38-year-old female seen for right knee pain.  Patient was injured on May 25th when her dog ran into her leg.  She felt a pop.  X-rays taken at a urgent care showed a tibial plateau fracture.  Patient stop using the crutches in the brace a few weeks ago.  Very minimal pain.    Answers submitted by the patient for this visit:  Orthopedics Questionnaire (Submitted on 6/8/2023)  unexpected weight change: No  appetite change : No  sleep disturbance: No  IMMUNOCOMPROMISED: No  nervous/ anxious: No  dysphoric mood: No  rash: No  visual disturbance: No  eye redness: No  eye pain: No  ear pain: No  tinnitus: No  hearing loss: No  sinus pressure : No  nosebleeds: No  enviro allergies:  No  food allergies: No  cough: No  shortness of breath: No  sweating: No  dysuria: No  frequency: No  difficulty urinating: No  hematuria: No  painful intercourse: No  chest pain: No  palpitations: No  nausea: No  vomiting: No  diarrhea: No  blood in stool: No  constipation: No  headaches: No  dizziness: No  numbness: No  seizures: No  joint swelling: Yes  myalgia: Yes  weakness: Yes  back pain: No   (Submitted on 6/8/2023)  Chief Complaint: Leg pain  Pain Chronicity: new  History of trauma: Yes  Onset: 1 to 4 weeks ago  Frequency: daily  Progression since onset: waxing and waning  Injury mechanism: collision/contact  injury location: on the field  pain- numeric: 2/10  pain location: right knee  pain quality: dull, hot, tightness, burning, numbing, throbbing, tingling, numb  Radiating Pain: No  Aggravating factors: activity, bending, extension, rotation  fever: No  inability to bear weight: Yes  itching: No  joint locking: Yes  limited range of motion: Yes  stiffness: Yes  tingling: Yes  Treatments tried: rest  physical therapy: not tried  Improvement on treatment: no relief      Physical Examination:    Vital Signs:  There were no vitals filed for this visit.    There is no height or weight on file to calculate BMI.    This a well-developed, well nourished patient in no acute distress.  They are alert and oriented and cooperative to examination.  Pt. walks without an antalgic gait.      Examination of the right knee shows no rashes or erythema. There are no masses ecchymosis or effusion. Patient has full range of motion from 0-130°. Patient is mildly tender to palpation over lateral joint line and nontender to palpation over the medial joint line.  Knee is stable to varus and valgus stress. 5 out of 5 motor strength. Palpable distal pulses. Intact light touch sensation. Negative Patellofemoral crepitus      X-rays:  X-rays of the right knee are ordered and reviewed which show no obvious Fracture at this time.        Assessment::  Right tibial plateau fracture    Plan:  I reviewed the findings with her today.  Patient has a very minimally displaced fracture of the lateral tibial plateau.   Okay for normal range of motion.  We will let her progress to weight-bearing as tolerated.  We can start some physical therapy at this time as well.  Follow up in 6 weeks with 1 last x-ray of the right knee.    All previous pertinent notes including ER visits, physical therapy visits, other orthopedic visits as well as other care for the same musculoskeletal problem were reviewed.  All pertinent lab values and previous imaging was reviewed pertinent to the current visit.    This note was created using [email or web address] voice recognition software that occasionally misinterpreted phrases or words.    Consult note is delivered via Epic messaging service.

## 2023-07-21 ENCOUNTER — HOSPITAL ENCOUNTER (OUTPATIENT)
Dept: RADIOLOGY | Facility: HOSPITAL | Age: 38
Discharge: HOME | End: 2023-07-21
Attending: NURSE PRACTITIONER
Payer: COMMERCIAL

## 2023-07-21 DIAGNOSIS — Z12.31 ENCOUNTER FOR SCREENING MAMMOGRAM FOR MALIGNANT NEOPLASM OF BREAST: ICD-10-CM

## 2023-07-21 PROCEDURE — 77067 MAMMO DIGITAL SCREENING BILAT WITH TOMO: ICD-10-PCS | Mod: 26,,, | Performed by: RADIOLOGY

## 2023-07-21 PROCEDURE — 77067 SCR MAMMO BI INCL CAD: CPT | Mod: TC

## 2023-07-21 PROCEDURE — 77067 SCR MAMMO BI INCL CAD: CPT | Mod: 26,,, | Performed by: RADIOLOGY

## 2023-07-21 PROCEDURE — 77063 MAMMO DIGITAL SCREENING BILAT WITH TOMO: ICD-10-PCS | Mod: 26,,, | Performed by: RADIOLOGY

## 2023-07-21 PROCEDURE — 77063 BREAST TOMOSYNTHESIS BI: CPT | Mod: 26,,, | Performed by: RADIOLOGY

## 2024-03-19 ENCOUNTER — PATIENT MESSAGE (OUTPATIENT)
Dept: ADMINISTRATIVE | Facility: HOSPITAL | Age: 39
End: 2024-03-19
Payer: COMMERCIAL

## 2024-07-09 ENCOUNTER — PATIENT MESSAGE (OUTPATIENT)
Dept: ADMINISTRATIVE | Facility: HOSPITAL | Age: 39
End: 2024-07-09
Payer: COMMERCIAL

## 2024-07-18 ENCOUNTER — PATIENT MESSAGE (OUTPATIENT)
Dept: FAMILY MEDICINE | Facility: CLINIC | Age: 39
End: 2024-07-18
Payer: COMMERCIAL

## 2024-07-29 ENCOUNTER — TELEPHONE (OUTPATIENT)
Dept: FAMILY MEDICINE | Facility: CLINIC | Age: 39
End: 2024-07-29
Payer: COMMERCIAL

## 2024-10-11 ENCOUNTER — HOSPITAL ENCOUNTER (OUTPATIENT)
Dept: RADIOLOGY | Facility: HOSPITAL | Age: 39
Discharge: HOME | End: 2024-10-11
Attending: NURSE PRACTITIONER
Payer: COMMERCIAL

## 2024-10-11 ENCOUNTER — OFFICE VISIT (OUTPATIENT)
Dept: OBSTETRICS AND GYNECOLOGY | Facility: CLINIC | Age: 39
End: 2024-10-11
Payer: COMMERCIAL

## 2024-10-11 VITALS
HEART RATE: 103 BPM | OXYGEN SATURATION: 97 % | WEIGHT: 198.63 LBS | BODY MASS INDEX: 35.2 KG/M2 | DIASTOLIC BLOOD PRESSURE: 76 MMHG | SYSTOLIC BLOOD PRESSURE: 110 MMHG | HEIGHT: 63 IN

## 2024-10-11 VITALS — BODY MASS INDEX: 35.08 KG/M2 | HEIGHT: 63 IN | WEIGHT: 198 LBS

## 2024-10-11 DIAGNOSIS — Z12.4 PAP SMEAR FOR CERVICAL CANCER SCREENING: ICD-10-CM

## 2024-10-11 DIAGNOSIS — Z12.31 ENCOUNTER FOR SCREENING MAMMOGRAM FOR BREAST CANCER: ICD-10-CM

## 2024-10-11 DIAGNOSIS — Z87.42 HISTORY OF ABNORMAL CERVICAL PAP SMEAR: ICD-10-CM

## 2024-10-11 DIAGNOSIS — Z01.419 WELL WOMAN EXAM WITH ROUTINE GYNECOLOGICAL EXAM: Primary | ICD-10-CM

## 2024-10-11 DIAGNOSIS — Z30.011 ENCOUNTER FOR INITIAL PRESCRIPTION OF CONTRACEPTIVE PILLS: ICD-10-CM

## 2024-10-11 PROCEDURE — 77063 BREAST TOMOSYNTHESIS BI: CPT | Mod: 26,,, | Performed by: RADIOLOGY

## 2024-10-11 PROCEDURE — 77067 SCR MAMMO BI INCL CAD: CPT | Mod: 26,,, | Performed by: RADIOLOGY

## 2024-10-11 PROCEDURE — 77063 BREAST TOMOSYNTHESIS BI: CPT | Mod: TC,PO

## 2024-10-11 PROCEDURE — 77067 SCR MAMMO BI INCL CAD: CPT | Mod: TC,PO

## 2024-10-11 PROCEDURE — 99999 PR PBB SHADOW E&M-EST. PATIENT-LVL III: CPT | Mod: PBBFAC,,, | Performed by: FAMILY MEDICINE

## 2024-10-11 RX ORDER — NORETHINDRONE ACETATE AND ETHINYL ESTRADIOL, ETHINYL ESTRADIOL AND FERROUS FUMARATE 1MG-10(24)
1 KIT ORAL DAILY
Qty: 90 TABLET | Refills: 3 | Status: SHIPPED | OUTPATIENT
Start: 2024-10-11 | End: 2025-10-11

## 2024-10-11 NOTE — PROGRESS NOTES
HISTORY OF THE PRESENT ILLNESS    10/11/2024  Clemente Webb is a 39 y.o. here for Annual Exam  .    G'sP's:   LMP: Patient's last menstrual period was 2024 (exact date).  Relationship:   Contraception: ASHOK and (plans to use)    PAP'S: h/o abnormal & now cleared to routine surveillance  PAP: PAP neg / Date:     Last Mammogram: 23 Results:  negative     LABS & RADS   Lab Results   Component Value Date    WBC 11.26 01/10/2023    HGB 14.6 01/10/2023    HCT 42.9 01/10/2023     01/10/2023    MCV 93 01/10/2023      Lab Results   Component Value Date    HEPCAB Non-reactive 01/10/2023    [id number] Non-reactive 01/10/2023        GYNECOLOGIC HISTORY  History of abnormal pap and LEEP    Genital HSV: no  STD Hx: no past history    Menarche: 12  Period duration: 5--6 days  # Heavy Days: 2-3  Pad/tampon ? on heavy days: every few hours  Intermenstrual bleeding: No  Period frequency:regular every 28-30 days    OBSTETRIC HISTORY  OB History    Para Term  AB Living   1 1 1         SAB IAB Ectopic Multiple Live Births                  # Outcome Date GA Lbr Brandon/2nd Weight Sex Type Anes PTL Lv   1 Term                PAST MEDICAL HISTORY  -------------------------------------    Ovarian cyst         PAST SURGICAL HISTORY  ----------------------------    Cervical biopsy  w/ loop electrode excision    Mouth surgery         ALLERGIES  Review of patient's allergies indicates:   Allergen Reactions    Ketorolac tromethamine Rash       MEDICATIONS  Current Outpatient Medications   Medication Instructions    melatonin (MELATIN) 3 mg, Nightly PRN    norethindrone-e.estradioL-iron (LO LOESTRIN FE) 1 mg-10 mcg (24)/10 mcg (2) Tab 1 tablet, Oral, Daily    omeprazole (PRILOSEC) 40 mg, Oral, Daily    sumatriptan (IMITREX) 50 MG tablet Take 1 tablet at start of migraine, may repeat dose in 2 hours- max dose of 200 mg/day    topiramate (TOPAMAX) 25 MG tablet Take 1 tablet by mouth at bedtime x1 week.  Then increase to 1 tablet by mouth twice daily       SOCIAL HISTORY  Social History     Tobacco Use    Smoking status: Every Day     Current packs/day: 2.00     Types: Cigarettes    Smokeless tobacco: Never   Substance Use Topics    Alcohol use: Not Currently    Drug use: Never      Lives with:   Domestic Violence: no  Occupation:  sales respresentative      FAMILY HISTORY  BLEEDING or  CLOTTING DISORDERS: none  BREAST CA: none  UTERINE CA: none  OVARIAN CA: none  COLON CA: none    REVIEW OF SYSTEMS  Review of Systems   Constitutional:  Negative for activity change, appetite change and fever.   Respiratory:  Negative for cough and shortness of breath.    Genitourinary:  Negative for dysuria, flank pain, frequency, pelvic pain, urgency and urinary incontinence.   Psychiatric/Behavioral:  Negative for depression.      --------------------------------------------------------------------------------------------------------------    PHYSICAL EXAM  VITALS:  Vitals:    10/11/24 1010   BP: 110/76   Pulse: 103     Exam conducted with a chaperone present.     Physical Exam:   Constitutional: She is oriented to person, place, and time. She appears well-developed and well-nourished.    HENT:   Head: Normocephalic.    Eyes: Pupils are equal, round, and reactive to light. Conjunctivae and EOM are normal.      Pulmonary/Chest: Effort normal. Right breast exhibits no mass and no tenderness. Left breast exhibits no mass and no tenderness.          Genitourinary:    Inguinal canal, vagina, uterus, right adnexa, left adnexa and rectum normal.      Pelvic exam was performed with patient in the lithotomy position.   The external female genitalia was normal.   Genitalia hair distrobution normal .   Cervix is normal. Vagina was moist.Uterus consistancy normal and Uerus contour normal            Musculoskeletal: Normal range of motion and moves all extremeties.       Neurological: She is alert and oriented to person, place, and  time.    Skin: Skin is warm and dry.    Psychiatric: She has a normal mood and affect.          ASSESSMENT AND PLAN:  Clemente Webb 39 y.o.   Clemente was seen today for annual exam.    Diagnoses and all orders for this visit:    Well woman exam with routine gynecological exam / Pap smear for cervical cancer screening / History of abnormal cervical Pap smear  -     HPV High Risk Genotypes, PCR  -     Liquid-Based Pap Smear, Screening    Encounter for initial prescription of contraceptive pills  -     norethindrone-e.estradioL-iron (LO LOESTRIN FE) 1 mg-10 mcg (24)/10 mcg (2) Tab; Take 1 tablet by mouth once daily.        Cervical Cancer screening: f/u results of PAP / HPV --> if both negative then next screen in 5 yrs  HPV Vaccine: declines    Mammogram: ordered already, will schedule today  We reviewed the various forms of birth control currently available to the patient (medications, including oral, intramuscular and transdermal, barrier methods, devices (IUDs and the Nexplanon) and permanent surgical procedures).    The patient's questions were answered and at this time she would like to proceed with loestrin.    The pros, cons, risks, benefits, alternatives and indications of the medication(s) prescribed, as well as appropriate use and potential side effects were discussed.    The patient's questions were answered regarding the above and she is in agreement with the current plan.  From a gynecologic standpoint the patient is currently doing well without complaints.     Patient was counseled today on :  Pap guidelines  Recommend periodic pelvic exams with visual inspection and palpation  mammograms starting annually at age 40  Encouraged self breast awareness; RTC for breast concerns  Colonoscopy after the age of 50  Dexa Bone Scan and calcium and vitamin D supplementation in menopause and to see her PCP for other health maintenance.     RTC for periodic GYN exam, sooner prn      Naa Becerra     Follow up for  as needed for OB/GYN concerns.   Future Appointments   Date Time Provider Department Center   10/11/2024 11:30 AM [facility] [id number] [facility]         Tests to Keep You Healthy    Mammogram: SCHEDULED  Cervical Cancer Screening: ORDERED

## 2024-11-05 ENCOUNTER — OFFICE VISIT (OUTPATIENT)
Dept: URGENT CARE | Facility: CLINIC | Age: 39
End: 2024-11-05
Payer: COMMERCIAL

## 2024-11-05 VITALS
TEMPERATURE: 98 F | BODY MASS INDEX: 35.08 KG/M2 | WEIGHT: 198 LBS | HEIGHT: 63 IN | SYSTOLIC BLOOD PRESSURE: 125 MMHG | HEART RATE: 81 BPM | RESPIRATION RATE: 17 BRPM | OXYGEN SATURATION: 98 % | DIASTOLIC BLOOD PRESSURE: 89 MMHG

## 2024-11-05 DIAGNOSIS — H66.91 RIGHT OTITIS MEDIA, UNSPECIFIED OTITIS MEDIA TYPE: Primary | ICD-10-CM

## 2024-11-05 RX ORDER — AMOXICILLIN 500 MG/1
500 TABLET, FILM COATED ORAL EVERY 12 HOURS
Qty: 14 TABLET | Refills: 0 | Status: SHIPPED | OUTPATIENT
Start: 2024-11-05 | End: 2024-11-12

## 2024-11-05 NOTE — PROGRESS NOTES
"Subjective:      Patient ID: Clemente Webb is a 39 y.o. female.    Vitals:  height is 5' 3" (1.6 m) and weight is 89.8 kg (198 lb). Her temperature is 98.1 °F (36.7 °C). Her blood pressure is 125/89 and her pulse is 81. Her respiration is 17 and oxygen saturation is 98%.     Chief Complaint: Otalgia    Rt ear pain behind ear xbegan two days prior.    Otalgia   There is pain in the right ear. This is a new problem. The current episode started in the past 7 days. The problem occurs constantly.       Constitution: Negative for fever.   HENT:  Positive for ear pain.       Objective:     Physical Exam   Constitutional: She is oriented to person, place, and time. She appears well-developed. She is cooperative.  Non-toxic appearance. She does not appear ill. No distress.   HENT:   Head: Normocephalic and atraumatic.   Ears:   Right Ear: Hearing, external ear and ear canal normal.   Left Ear: Hearing, tympanic membrane, external ear and ear canal normal.      Comments: Right-sided tympanic membrane erythematous.  Nose: Nose normal. No mucosal edema, rhinorrhea or nasal deformity. No epistaxis. Right sinus exhibits no maxillary sinus tenderness and no frontal sinus tenderness. Left sinus exhibits no maxillary sinus tenderness and no frontal sinus tenderness.   Mouth/Throat: Uvula is midline, oropharynx is clear and moist and mucous membranes are normal. Mucous membranes are moist. No trismus in the jaw. Normal dentition. No uvula swelling. No oropharyngeal exudate, posterior oropharyngeal edema or posterior oropharyngeal erythema. Oropharynx is clear.   Eyes: Conjunctivae and lids are normal. No scleral icterus.   Neck: Trachea normal and phonation normal. Neck supple. No edema present. No erythema present. No neck rigidity present.   Cardiovascular: Normal rate, regular rhythm, normal heart sounds and normal pulses.   Pulmonary/Chest: Effort normal and breath sounds normal. No respiratory distress. She has no decreased " breath sounds. She has no rhonchi.   Abdominal: Normal appearance.   Musculoskeletal: Normal range of motion.         General: No deformity. Normal range of motion.   Neurological: She is alert and oriented to person, place, and time. She exhibits normal muscle tone. Coordination normal.   Skin: Skin is warm, dry, intact, not diaphoretic and not pale.   Psychiatric: Her speech is normal and behavior is normal. Judgment and thought content normal.   Nursing note and vitals reviewed.      Assessment:     1. Right otitis media, unspecified otitis media type        Plan:       Right otitis media, unspecified otitis media type    Other orders  -     amoxicillin (AMOXIL) 500 MG Tab; Take 1 tablet (500 mg total) by mouth every 12 (twelve) hours. for 7 days  Dispense: 14 tablet; Refill: 0           Take antibiotic as directed, may take yogurt daily or probiotic to decrease GI upset.  Patient educated that tympanic effusions are common following otitis media treatment.  Instructions given for when to present to ED.  Pain is behind right ear and lower, differentials include otitis media, trigeminal neuralgia, neuralgic pain, TMJ dysfunction.  Discussed presenting to neurologist versus ENT if symptoms do not resolve.  Patient is in agreement with plan of care  - To ED for any new or acutely worsening symptoms including but not limited to chest pain, palpitations, shortness of breath, or fever greater than 103° F.  Patient in agreement with plan of care.    - The diagnosis, treatment plan, instructions for follow-up and reevaluation as well as ED precautions were discussed and understanding was verbalized. All questions or concerns have been addressed.  -Follow up with your primary care provider for continued evaluation and management.

## 2025-04-03 ENCOUNTER — TELEPHONE (OUTPATIENT)
Dept: FAMILY MEDICINE | Facility: CLINIC | Age: 40
End: 2025-04-03
Payer: COMMERCIAL

## 2025-04-03 NOTE — TELEPHONE ENCOUNTER
----- Message from Lora sent at 4/3/2025 11:05 AM CDT -----  Type:  Patient Returning CallWho Called:pt Who Left Message for Patient:office personnel Does the patient know what this is regarding?:unsure Would the patient rather a call back or a response via [facility]? Please noelle Best Call Back Number:[phone] Additional Information: pt received a call and is in hopes that the office now accepts her insurance    Please call back to advise. Thanks!